# Patient Record
(demographics unavailable — no encounter records)

---

## 2025-01-06 NOTE — REASON FOR VISIT
Is this okay top schedule  ? ? [Initial Evaluation] : an initial evaluation [Patient] : patient [Mother] : mother

## 2025-01-09 NOTE — REVIEW OF SYSTEMS
[Normal] : Psychologic [de-identified] : stress 6/10 school; depression momentary denies but is frustrated with surroundings

## 2025-01-09 NOTE — PHYSICAL EXAM
[Normal] : deep tendon reflexes were 2+ and symmetric [de-identified] : unable to appreciate HSM due to large body habitus

## 2025-01-09 NOTE — SOCIAL HISTORY
[Seen by a Nutritionist] : not previously seen by a nutritionist [Seen by Mental Health Professional] : not previously seen by a mental health professional  [de-identified] : lives with both parents and brother - gets along well [de-identified] : [place] in [facility] bound and considering medicine [de-identified] : school anxiety; social anxiety with some history of dealing with people who are not nice; has friends - closest to brother and cousin both 24yo

## 2025-01-09 NOTE — HISTORY OF PRESENT ILLNESS
[FreeTextEntry1] : Patient is 18yo male seen for initial weight management visit Over the past year he has maintained weight but did regain a lot of weight he lost 2 years ago. He lost it calorie counting and playing football last summer - also was weight lifting - 2.5 years ago  He tore his meniscus 2 years ago and that interfered with weight management  Surgical repair of lateral R meniscus tear in February 2024 and post op PT Current sports - Summer at the gym - not much exercise since senior year started  Exercise in past 4 months - walks 1-2 miles no significant exercise  bkfst - bread x 3 w/3 eggs and hot sauce dinner - mashed powdered potatoes package and nuggets x 4  vegetable - last not sure but likes broccoli, celery,  fruit - yesterday grapes and other berries

## 2025-01-09 NOTE — REVIEW OF SYSTEMS
[Normal] : Psychologic [de-identified] : stress 6/10 school; depression momentary denies but is frustrated with surroundings

## 2025-01-09 NOTE — SOCIAL HISTORY
[Seen by a Nutritionist] : not previously seen by a nutritionist [Seen by Mental Health Professional] : not previously seen by a mental health professional  [de-identified] : lives with both parents and brother - gets along well [de-identified] : [place] in [facility] [place] and considering medicine [de-identified] : school anxiety; social anxiety with some history of dealing with people who are not nice; has friends - closest to brother and cousin both 24yo

## 2025-01-09 NOTE — ASSESSMENT
[FreeTextEntry1] : Patient is 18yo male seen for weight management initial visit with desire to work on lifestyle and not take medication at this time. He has not returned to exercise in a meaningful way but currently feels motivated to change his eating habits. He has not increased fiber and does eat eggs, cheese, and red meat with some frequency so understands that due to elevated cholesterol and triglycerides, new dietary guidelines will be in place for him with reduction of these items to a couple of times per week. He has no current symptoms of concern

## 2025-01-09 NOTE — PHYSICAL EXAM
[Normal] : deep tendon reflexes were 2+ and symmetric [de-identified] : unable to appreciate HSM due to large body habitus

## 2025-01-09 NOTE — HISTORY OF PRESENT ILLNESS
[FreeTextEntry1] : Patient is 16yo male seen for initial weight management visit Over the past year he has maintained weight but did regain a lot of weight he lost 2 years ago. He lost it calorie counting and playing football last summer - also was weight lifting - 2.5 years ago  He tore his meniscus 2 years ago and that interfered with weight management  Surgical repair of lateral R meniscus tear in February 2024 and post op PT Current sports - Summer at the gym - not much exercise since senior year started  Exercise in past 4 months - walks 1-2 miles no significant exercise  bkfst - bread x 3 w/3 eggs and hot sauce dinner - mashed powdered potatoes package and nuggets x 4  vegetable - last not sure but likes broccoli, celery,  fruit - yesterday grapes and other berries

## 2025-01-09 NOTE — ASSESSMENT
[FreeTextEntry1] : Patient is 16yo male seen for weight management initial visit with desire to work on lifestyle and not take medication at this time. He has not returned to exercise in a meaningful way but currently feels motivated to change his eating habits. He has not increased fiber and does eat eggs, cheese, and red meat with some frequency so understands that due to elevated cholesterol and triglycerides, new dietary guidelines will be in place for him with reduction of these items to a couple of times per week. He has no current symptoms of concern